# Patient Record
Sex: MALE | Race: WHITE | NOT HISPANIC OR LATINO | Employment: FULL TIME | URBAN - NONMETROPOLITAN AREA
[De-identification: names, ages, dates, MRNs, and addresses within clinical notes are randomized per-mention and may not be internally consistent; named-entity substitution may affect disease eponyms.]

---

## 2023-05-08 DIAGNOSIS — I10 BENIGN HYPERTENSION: ICD-10-CM

## 2023-05-08 RX ORDER — TRIAMTERENE AND HYDROCHLOROTHIAZIDE 75; 50 MG/1; MG/1
1 TABLET ORAL DAILY
Qty: 90 TABLET | Refills: 0 | Status: SHIPPED | OUTPATIENT
Start: 2023-05-08 | End: 2023-08-07 | Stop reason: SDUPTHER

## 2023-05-08 RX ORDER — TRIAMTERENE AND HYDROCHLOROTHIAZIDE 75; 50 MG/1; MG/1
1 TABLET ORAL DAILY
COMMUNITY
End: 2023-05-08 | Stop reason: SDUPTHER

## 2023-05-19 PROBLEM — I10 BENIGN ESSENTIAL HYPERTENSION: Status: ACTIVE | Noted: 2023-05-19

## 2023-05-19 PROBLEM — E78.5 HYPERLIPIDEMIA: Status: ACTIVE | Noted: 2023-05-19

## 2023-05-19 NOTE — PROGRESS NOTES
Subjective   Patient ID:   Freedom Lay is a 58 y.o. male who presents for No chief complaint on file..  HPI  HTN:  Taking Triamterene-HCTZ.  BP today is  BP has been up and down.  Was to start checking at home.  Denies dizziness, headaches.    HLD:  Taking Atorvastatin.  Last checked Dec 2022.  DUE for labs.    Health maintenance:  Smoking: Former smoker.  PSA (50+): June 2022  Labs: June 2022. DUE  Colonoscopy (50-75): Aug 2022  Influenza:     Review of Systems  12 point review of systems negative unless stated above in HPI    There were no vitals filed for this visit.    Physical Exam  General: Alert and oriented, well nourished, no acute distress.  Lungs: Clear to auscultation, non-labored respiration.  Heart: Normal rate, regular rhythm, no murmur, gallop or edema.  Neurologic: Awake, alert, and oriented X3, CN II-XII intact.  Psychiatric: Cooperative, appropriate mood and affect.    Assessment/Plan   Diagnoses and all orders for this visit:  Routine general medical examination at a health care facility  Screening PSA (prostate specific antigen)  Benign essential hypertension  Pure hypercholesterolemia

## 2023-05-23 ENCOUNTER — OFFICE VISIT (OUTPATIENT)
Dept: PRIMARY CARE | Facility: CLINIC | Age: 58
End: 2023-05-23
Payer: COMMERCIAL

## 2023-05-23 ENCOUNTER — LAB (OUTPATIENT)
Dept: LAB | Facility: LAB | Age: 58
End: 2023-05-23
Payer: COMMERCIAL

## 2023-05-23 VITALS
DIASTOLIC BLOOD PRESSURE: 92 MMHG | SYSTOLIC BLOOD PRESSURE: 136 MMHG | OXYGEN SATURATION: 97 % | BODY MASS INDEX: 29.98 KG/M2 | WEIGHT: 202.4 LBS | HEART RATE: 71 BPM | HEIGHT: 69 IN

## 2023-05-23 DIAGNOSIS — E78.00 PURE HYPERCHOLESTEROLEMIA: ICD-10-CM

## 2023-05-23 DIAGNOSIS — Z00.00 ROUTINE GENERAL MEDICAL EXAMINATION AT A HEALTH CARE FACILITY: Primary | ICD-10-CM

## 2023-05-23 DIAGNOSIS — R10.9 STOMACH CRAMPS: ICD-10-CM

## 2023-05-23 DIAGNOSIS — Z12.5 SCREENING PSA (PROSTATE SPECIFIC ANTIGEN): ICD-10-CM

## 2023-05-23 DIAGNOSIS — I10 BENIGN ESSENTIAL HYPERTENSION: ICD-10-CM

## 2023-05-23 PROBLEM — M51.34 DEGENERATION OF INTERVERTEBRAL DISC OF THORACIC REGION: Status: ACTIVE | Noted: 2023-05-23

## 2023-05-23 PROBLEM — R31.29 MICROSCOPIC HEMATURIA: Status: RESOLVED | Noted: 2023-05-23 | Resolved: 2023-05-23

## 2023-05-23 PROBLEM — J06.9 ACUTE URI: Status: RESOLVED | Noted: 2023-05-23 | Resolved: 2023-05-23

## 2023-05-23 PROBLEM — R10.11 ABDOMINAL PAIN, RUQ (RIGHT UPPER QUADRANT): Status: RESOLVED | Noted: 2023-05-23 | Resolved: 2023-05-23

## 2023-05-23 PROBLEM — R09.81 NASAL CONGESTION: Status: RESOLVED | Noted: 2023-05-23 | Resolved: 2023-05-23

## 2023-05-23 PROBLEM — J30.9 ALLERGIC RHINITIS: Status: RESOLVED | Noted: 2023-05-23 | Resolved: 2023-05-23

## 2023-05-23 PROBLEM — S23.9XXA THORACIC SPRAIN: Status: RESOLVED | Noted: 2023-05-23 | Resolved: 2023-05-23

## 2023-05-23 PROBLEM — R07.9 CHEST PAIN: Status: RESOLVED | Noted: 2023-05-23 | Resolved: 2023-05-23

## 2023-05-23 PROBLEM — E66.3 OVERWEIGHT (BMI 25.0-29.9): Status: RESOLVED | Noted: 2023-05-23 | Resolved: 2023-05-23

## 2023-05-23 PROBLEM — R91.8 LUNG NODULES: Status: ACTIVE | Noted: 2023-05-23

## 2023-05-23 LAB
ALANINE AMINOTRANSFERASE (SGPT) (U/L) IN SER/PLAS: 33 U/L (ref 10–52)
ALBUMIN (G/DL) IN SER/PLAS: 4.2 G/DL (ref 3.4–5)
ALKALINE PHOSPHATASE (U/L) IN SER/PLAS: 60 U/L (ref 33–120)
ANION GAP IN SER/PLAS: 10 MMOL/L (ref 10–20)
ASPARTATE AMINOTRANSFERASE (SGOT) (U/L) IN SER/PLAS: 27 U/L (ref 9–39)
BASOPHILS (10*3/UL) IN BLOOD BY AUTOMATED COUNT: 0.06 X10E9/L (ref 0–0.1)
BASOPHILS/100 LEUKOCYTES IN BLOOD BY AUTOMATED COUNT: 1.1 % (ref 0–2)
BILIRUBIN TOTAL (MG/DL) IN SER/PLAS: 0.6 MG/DL (ref 0–1.2)
CALCIUM (MG/DL) IN SER/PLAS: 9.6 MG/DL (ref 8.6–10.3)
CARBON DIOXIDE, TOTAL (MMOL/L) IN SER/PLAS: 30 MMOL/L (ref 21–32)
CHLORIDE (MMOL/L) IN SER/PLAS: 102 MMOL/L (ref 98–107)
CHOLESTEROL (MG/DL) IN SER/PLAS: 132 MG/DL (ref 0–199)
CHOLESTEROL IN HDL (MG/DL) IN SER/PLAS: 43.1 MG/DL
CHOLESTEROL/HDL RATIO: 3.1
CREATININE (MG/DL) IN SER/PLAS: 0.94 MG/DL (ref 0.5–1.3)
EOSINOPHILS (10*3/UL) IN BLOOD BY AUTOMATED COUNT: 0.14 X10E9/L (ref 0–0.7)
EOSINOPHILS/100 LEUKOCYTES IN BLOOD BY AUTOMATED COUNT: 2.6 % (ref 0–6)
ERYTHROCYTE DISTRIBUTION WIDTH (RATIO) BY AUTOMATED COUNT: 13.7 % (ref 11.5–14.5)
ERYTHROCYTE MEAN CORPUSCULAR HEMOGLOBIN CONCENTRATION (G/DL) BY AUTOMATED: 33.8 G/DL (ref 32–36)
ERYTHROCYTE MEAN CORPUSCULAR VOLUME (FL) BY AUTOMATED COUNT: 91 FL (ref 80–100)
ERYTHROCYTES (10*6/UL) IN BLOOD BY AUTOMATED COUNT: 5.25 X10E12/L (ref 4.5–5.9)
GFR MALE: >90 ML/MIN/1.73M2
GLUCOSE (MG/DL) IN SER/PLAS: 92 MG/DL (ref 74–99)
HEMATOCRIT (%) IN BLOOD BY AUTOMATED COUNT: 47.6 % (ref 41–52)
HEMOGLOBIN (G/DL) IN BLOOD: 16.1 G/DL (ref 13.5–17.5)
IMMATURE GRANULOCYTES/100 LEUKOCYTES IN BLOOD BY AUTOMATED COUNT: 0.6 % (ref 0–0.9)
LDL: 78 MG/DL (ref 0–99)
LEUKOCYTES (10*3/UL) IN BLOOD BY AUTOMATED COUNT: 5.3 X10E9/L (ref 4.4–11.3)
LYMPHOCYTES (10*3/UL) IN BLOOD BY AUTOMATED COUNT: 1.4 X10E9/L (ref 1.2–4.8)
LYMPHOCYTES/100 LEUKOCYTES IN BLOOD BY AUTOMATED COUNT: 26.5 % (ref 13–44)
MONOCYTES (10*3/UL) IN BLOOD BY AUTOMATED COUNT: 0.58 X10E9/L (ref 0.1–1)
MONOCYTES/100 LEUKOCYTES IN BLOOD BY AUTOMATED COUNT: 11 % (ref 2–10)
NEUTROPHILS (10*3/UL) IN BLOOD BY AUTOMATED COUNT: 3.08 X10E9/L (ref 1.2–7.7)
NEUTROPHILS/100 LEUKOCYTES IN BLOOD BY AUTOMATED COUNT: 58.2 % (ref 40–80)
PLATELETS (10*3/UL) IN BLOOD AUTOMATED COUNT: 278 X10E9/L (ref 150–450)
POTASSIUM (MMOL/L) IN SER/PLAS: 3.8 MMOL/L (ref 3.5–5.3)
PROSTATE SPECIFIC ANTIGEN,SCREEN: 1.2 NG/ML (ref 0–4)
PROTEIN TOTAL: 7 G/DL (ref 6.4–8.2)
SODIUM (MMOL/L) IN SER/PLAS: 138 MMOL/L (ref 136–145)
TRIGLYCERIDE (MG/DL) IN SER/PLAS: 57 MG/DL (ref 0–149)
UREA NITROGEN (MG/DL) IN SER/PLAS: 16 MG/DL (ref 6–23)
VLDL: 11 MG/DL (ref 0–40)

## 2023-05-23 PROCEDURE — 84153 ASSAY OF PSA TOTAL: CPT

## 2023-05-23 PROCEDURE — 1036F TOBACCO NON-USER: CPT | Performed by: PHYSICIAN ASSISTANT

## 2023-05-23 PROCEDURE — 3075F SYST BP GE 130 - 139MM HG: CPT | Performed by: PHYSICIAN ASSISTANT

## 2023-05-23 PROCEDURE — 3080F DIAST BP >= 90 MM HG: CPT | Performed by: PHYSICIAN ASSISTANT

## 2023-05-23 PROCEDURE — 36415 COLL VENOUS BLD VENIPUNCTURE: CPT

## 2023-05-23 PROCEDURE — 85025 COMPLETE CBC W/AUTO DIFF WBC: CPT

## 2023-05-23 PROCEDURE — 3077F SYST BP >= 140 MM HG: CPT | Performed by: PHYSICIAN ASSISTANT

## 2023-05-23 PROCEDURE — 80053 COMPREHEN METABOLIC PANEL: CPT

## 2023-05-23 PROCEDURE — 3079F DIAST BP 80-89 MM HG: CPT | Performed by: PHYSICIAN ASSISTANT

## 2023-05-23 PROCEDURE — 80061 LIPID PANEL: CPT

## 2023-05-23 PROCEDURE — 99396 PREV VISIT EST AGE 40-64: CPT | Performed by: PHYSICIAN ASSISTANT

## 2023-05-23 RX ORDER — ATORVASTATIN CALCIUM 20 MG/1
20 TABLET, FILM COATED ORAL DAILY
Qty: 90 TABLET | Refills: 1 | Status: SHIPPED | OUTPATIENT
Start: 2023-05-23 | End: 2023-11-06 | Stop reason: SDUPTHER

## 2023-05-23 RX ORDER — ATORVASTATIN CALCIUM 20 MG/1
20 TABLET, FILM COATED ORAL DAILY
COMMUNITY
End: 2023-05-23 | Stop reason: SDUPTHER

## 2023-05-23 NOTE — PROGRESS NOTES
Subjective   Patient ID:   Freedom Lay is a 58 y.o. male who presents for Annual Exam.  HPI  Pain scale: 0 (no pain)  Living will? No  POA? No  Are you currently or have you recently been threatened or abused? No  Do you feel unsafe going back to the place you are living? No  Reported health: Fair  Dental visits? No  Hearing problems? No  Vision problems? Yes - wears glasses  Healthy diet? Yes  Exercise? No exercise  Adequate fluid intake? No    HTN:  Taking Triamterene-HCTZ.  BP today is 155/89.  Improved to 136/92 on re-check.  BP has been up and down.  It was better at a work physical in Feb 2023.  He does not regularly check at home.  Denies dizziness, headaches.    Abdominal cramping:  Symptoms x years.  RUQ mostly.  Off and on.  Not overly bothersome.  States he has had a number of stomach scans that came back normal.  Possibly muscle?    HLD:  Taking Atorvastatin.  Last checked Dec 2022.  DUE for labs.    Health maintenance:  Smoking: Former smoker.  PSA (50+): June 2022. DUE  Labs: June 2022. DUE  Colonoscopy (50-75): Aug 2022  Influenza:     Social History     Tobacco Use    Smoking status: Former     Types: Cigarettes     Quit date: 2013     Years since quitting: 10.3    Smokeless tobacco: Never   Substance Use Topics    Alcohol use: Not Currently     Comment: socially    Drug use: Never       Immunization History   Administered Date(s) Administered    Pfizer Purple Cap SARS-CoV-2 07/22/2021, 08/16/2021       Review of Systems  12 point review of systems negative unless stated above in HPI    Vitals:    05/23/23 0835   BP: 155/89   Pulse: 71   SpO2: 97%       Physical Exam  General: Alert and oriented, well nourished, no acute distress.  Lungs: Clear to auscultation, non-labored respiration.  Heart: Normal rate, regular rhythm, no murmur, gallop or edema.  Neurologic: Awake, alert, and oriented X3, CN II-XII intact.  Psychiatric: Cooperative, appropriate mood and affect.    Assessment/Plan   This counts as  your annual Wellness visit.  Health maintenance was reviewed today.  Depression screening is negative.  I have ordered some labs to be done as soon as you can.  We will call you with the results.  Consider a trial of a muscle relaxer for the stomach symptoms.  BP improved on re-check.  Continue the same medications.  Chronic conditions are stable.  Call with questions or concerns.    F/u  6 months  Diagnoses and all orders for this visit:  Routine general medical examination at a health care facility  Screening PSA (prostate specific antigen)  -     Prostate Specific Antigen, Screen; Future  Benign essential hypertension  Pure hypercholesterolemia  -     Comprehensive Metabolic Panel; Future  -     Lipid Panel; Future  -     CBC and Auto Differential; Future

## 2023-08-07 DIAGNOSIS — I10 BENIGN HYPERTENSION: ICD-10-CM

## 2023-08-07 RX ORDER — TRIAMTERENE AND HYDROCHLOROTHIAZIDE 75; 50 MG/1; MG/1
1 TABLET ORAL DAILY
Qty: 90 TABLET | Refills: 0 | Status: SHIPPED | OUTPATIENT
Start: 2023-08-07 | End: 2023-11-06 | Stop reason: SDUPTHER

## 2023-11-06 DIAGNOSIS — I10 BENIGN HYPERTENSION: ICD-10-CM

## 2023-11-06 DIAGNOSIS — E78.00 PURE HYPERCHOLESTEROLEMIA: ICD-10-CM

## 2023-11-06 RX ORDER — ATORVASTATIN CALCIUM 20 MG/1
20 TABLET, FILM COATED ORAL DAILY
Qty: 90 TABLET | Refills: 0 | Status: SHIPPED | OUTPATIENT
Start: 2023-11-06 | End: 2023-11-28 | Stop reason: SDUPTHER

## 2023-11-06 RX ORDER — TRIAMTERENE AND HYDROCHLOROTHIAZIDE 75; 50 MG/1; MG/1
1 TABLET ORAL DAILY
Qty: 90 TABLET | Refills: 0 | Status: SHIPPED | OUTPATIENT
Start: 2023-11-06 | End: 2023-11-28 | Stop reason: SDUPTHER

## 2023-11-27 ENCOUNTER — APPOINTMENT (OUTPATIENT)
Dept: PRIMARY CARE | Facility: CLINIC | Age: 58
End: 2023-11-27
Payer: COMMERCIAL

## 2023-11-28 ENCOUNTER — APPOINTMENT (OUTPATIENT)
Dept: PRIMARY CARE | Facility: CLINIC | Age: 58
End: 2023-11-28
Payer: COMMERCIAL

## 2023-11-28 ENCOUNTER — OFFICE VISIT (OUTPATIENT)
Dept: PRIMARY CARE | Facility: CLINIC | Age: 58
End: 2023-11-28
Payer: COMMERCIAL

## 2023-11-28 VITALS
BODY MASS INDEX: 30.21 KG/M2 | HEIGHT: 69 IN | WEIGHT: 204 LBS | DIASTOLIC BLOOD PRESSURE: 81 MMHG | RESPIRATION RATE: 16 BRPM | SYSTOLIC BLOOD PRESSURE: 126 MMHG | HEART RATE: 80 BPM | OXYGEN SATURATION: 96 %

## 2023-11-28 DIAGNOSIS — I10 BENIGN HYPERTENSION: ICD-10-CM

## 2023-11-28 DIAGNOSIS — E78.00 PURE HYPERCHOLESTEROLEMIA: ICD-10-CM

## 2023-11-28 DIAGNOSIS — Z00.00 HEALTH CARE MAINTENANCE: ICD-10-CM

## 2023-11-28 PROCEDURE — 99214 OFFICE O/P EST MOD 30 MIN: CPT | Performed by: INTERNAL MEDICINE

## 2023-11-28 PROCEDURE — 1036F TOBACCO NON-USER: CPT | Performed by: INTERNAL MEDICINE

## 2023-11-28 PROCEDURE — 3079F DIAST BP 80-89 MM HG: CPT | Performed by: INTERNAL MEDICINE

## 2023-11-28 PROCEDURE — 3074F SYST BP LT 130 MM HG: CPT | Performed by: INTERNAL MEDICINE

## 2023-11-28 RX ORDER — TRIAMTERENE AND HYDROCHLOROTHIAZIDE 75; 50 MG/1; MG/1
1 TABLET ORAL DAILY
Qty: 90 TABLET | Refills: 1 | Status: SHIPPED | OUTPATIENT
Start: 2023-11-28 | End: 2024-05-21 | Stop reason: SDUPTHER

## 2023-11-28 RX ORDER — ATORVASTATIN CALCIUM 20 MG/1
20 TABLET, FILM COATED ORAL DAILY
Qty: 90 TABLET | Refills: 1 | Status: SHIPPED | OUTPATIENT
Start: 2023-11-28 | End: 2024-05-21 | Stop reason: SDUPTHER

## 2023-11-28 ASSESSMENT — PAIN SCALES - GENERAL: PAINLEVEL: 0-NO PAIN

## 2023-11-28 ASSESSMENT — PATIENT HEALTH QUESTIONNAIRE - PHQ9
SUM OF ALL RESPONSES TO PHQ9 QUESTIONS 1 AND 2: 0
2. FEELING DOWN, DEPRESSED OR HOPELESS: NOT AT ALL
1. LITTLE INTEREST OR PLEASURE IN DOING THINGS: NOT AT ALL

## 2023-11-28 NOTE — PROGRESS NOTES
Patient ID:   Freedom Lay is a 58 y.o. male with PMH remarkable for HTN, HLD, inguinal hernia repair with mesh, who presents to the office today for Follow-up, Hyperlipidemia, and Hypertension.    HEALTH MAINTENANCE: Follow Up  Smoking: Former Smoker, quit in 2013  Labs: 5/23/2023  PSA: 1.20 on 5/2023  Colonoscopy (45-75): 8/2/2022  CT Chest in 2016 noted benign stable nodules.   FU in 6m or sooner if needed.     SOCIAL HISTORY:  Social History     Tobacco Use    Smoking status: Former     Types: Cigarettes     Quit date: 2013     Years since quitting: 10.9    Smokeless tobacco: Never   Substance Use Topics    Alcohol use: Not Currently     Comment: socially    Drug use: Never     IMMUNIZATIONS:  Immunization History   Administered Date(s) Administered    Pfizer Purple Cap SARS-CoV-2 07/22/2021, 08/16/2021     REVIEW OF SYSTEMS:  Review of Systems   All other systems reviewed and are negative.    ALLERGIES:  No Known Allergies     VITAL SIGNS:  Vitals:    11/28/23 1211   BP: 126/81   Pulse: 80   Resp: 16   SpO2: 96%     Physical Exam  Vitals reviewed.   Constitutional:       General: He is not in acute distress.     Appearance: Normal appearance. He is not ill-appearing.   HENT:      Head: Normocephalic and atraumatic.      Right Ear: Tympanic membrane and external ear normal.      Left Ear: Tympanic membrane and external ear normal.      Nose: Nose normal.      Mouth/Throat:      Mouth: Mucous membranes are moist.      Pharynx: Oropharynx is clear.   Eyes:      Conjunctiva/sclera: Conjunctivae normal.      Pupils: Pupils are equal, round, and reactive to light.   Cardiovascular:      Rate and Rhythm: Normal rate and regular rhythm.      Heart sounds: Normal heart sounds. No murmur heard.  Pulmonary:      Effort: Pulmonary effort is normal. No respiratory distress.      Breath sounds: Normal breath sounds. No wheezing.   Abdominal:      General: There is no distension.      Palpations: Abdomen is soft. There is no  mass.      Tenderness: There is no abdominal tenderness.   Musculoskeletal:         General: Normal range of motion.      Cervical back: Normal range of motion and neck supple.   Skin:     General: Skin is warm and dry.   Neurological:      General: No focal deficit present.      Mental Status: He is alert and oriented to person, place, and time.      Sensory: No sensory deficit.      Motor: No weakness.      Coordination: Coordination normal.      Gait: Gait normal.   Psychiatric:         Mood and Affect: Mood normal.         Behavior: Behavior normal.       MEDICATIONS:  Current Outpatient Medications on File Prior to Visit   Medication Sig Dispense Refill    atorvastatin (Lipitor) 20 mg tablet Take 1 tablet (20 mg) by mouth once daily. 90 tablet 0    triamterene-hydrochlorothiazid (Maxzide) 75-50 mg tablet Take 1 tablet by mouth once daily. 90 tablet 0     No current facility-administered medications on file prior to visit.      LABORATORY DATA:  Lab Results   Component Value Date    WBC 5.3 05/23/2023    HGB 16.1 05/23/2023    HCT 47.6 05/23/2023     05/23/2023    CHOL 132 05/23/2023    TRIG 57 05/23/2023    HDL 43.1 05/23/2023    ALT 33 05/23/2023    AST 27 05/23/2023     05/23/2023    K 3.8 05/23/2023     05/23/2023    CREATININE 0.94 05/23/2023    BUN 16 05/23/2023    CO2 30 05/23/2023     ASSESSMENT AND PLAN:  Assessment/Plan   Diagnoses and all orders for this visit:  Health care maintenance  Pure hypercholesterolemia  -     atorvastatin (Lipitor) 20 mg tablet; Take 1 tablet (20 mg) by mouth once daily.  Benign hypertension  -     triamterene-hydrochlorothiazid (Maxzide) 75-50 mg tablet; Take 1 tablet by mouth once daily.      --------------------  Written by Radha Mcginnis RN, acting as a scribe for Dr. Peters. This note accurately reflects the work and decisions made by Dr. Peters.     I, Dr. Peters, attest all medical record entries made by the scribe were under my direction and were  personally dictated by me. I have reviewed the chart and agree that the record accurately reflects my performance of the history, physical exam, and assessment and plan.

## 2023-11-29 PROBLEM — Z00.00 HEALTH CARE MAINTENANCE: Status: ACTIVE | Noted: 2023-11-29

## 2024-03-25 NOTE — RESULT ENCOUNTER NOTE
If your double vision returns please come back to the emergency department for repeat evaluation   Normal lipid, CMP. Waiting on PSA

## 2024-05-21 ENCOUNTER — OFFICE VISIT (OUTPATIENT)
Dept: PRIMARY CARE | Facility: CLINIC | Age: 59
End: 2024-05-21
Payer: COMMERCIAL

## 2024-05-21 VITALS
HEART RATE: 76 BPM | HEIGHT: 69 IN | WEIGHT: 193.6 LBS | DIASTOLIC BLOOD PRESSURE: 90 MMHG | RESPIRATION RATE: 18 BRPM | BODY MASS INDEX: 28.68 KG/M2 | OXYGEN SATURATION: 96 % | SYSTOLIC BLOOD PRESSURE: 142 MMHG

## 2024-05-21 DIAGNOSIS — I10 BENIGN HYPERTENSION: ICD-10-CM

## 2024-05-21 DIAGNOSIS — Z00.00 HEALTH CARE MAINTENANCE: ICD-10-CM

## 2024-05-21 DIAGNOSIS — R91.8 LUNG NODULES: ICD-10-CM

## 2024-05-21 DIAGNOSIS — E78.00 PURE HYPERCHOLESTEROLEMIA: ICD-10-CM

## 2024-05-21 DIAGNOSIS — Z12.5 SCREENING PSA (PROSTATE SPECIFIC ANTIGEN): ICD-10-CM

## 2024-05-21 DIAGNOSIS — I10 BENIGN ESSENTIAL HYPERTENSION: ICD-10-CM

## 2024-05-21 PROCEDURE — 3077F SYST BP >= 140 MM HG: CPT | Performed by: INTERNAL MEDICINE

## 2024-05-21 PROCEDURE — 1036F TOBACCO NON-USER: CPT | Performed by: INTERNAL MEDICINE

## 2024-05-21 PROCEDURE — 99214 OFFICE O/P EST MOD 30 MIN: CPT | Performed by: INTERNAL MEDICINE

## 2024-05-21 PROCEDURE — 3080F DIAST BP >= 90 MM HG: CPT | Performed by: INTERNAL MEDICINE

## 2024-05-21 RX ORDER — TRIAMTERENE AND HYDROCHLOROTHIAZIDE 75; 50 MG/1; MG/1
1 TABLET ORAL DAILY
Qty: 90 TABLET | Refills: 1 | Status: SHIPPED | OUTPATIENT
Start: 2024-05-21

## 2024-05-21 RX ORDER — ATORVASTATIN CALCIUM 20 MG/1
20 TABLET, FILM COATED ORAL DAILY
Qty: 90 TABLET | Refills: 1 | Status: SHIPPED | OUTPATIENT
Start: 2024-05-21 | End: 2024-11-17

## 2024-05-21 ASSESSMENT — PATIENT HEALTH QUESTIONNAIRE - PHQ9
2. FEELING DOWN, DEPRESSED OR HOPELESS: NOT AT ALL
SUM OF ALL RESPONSES TO PHQ9 QUESTIONS 1 AND 2: 0
1. LITTLE INTEREST OR PLEASURE IN DOING THINGS: NOT AT ALL

## 2024-05-21 ASSESSMENT — PAIN SCALES - GENERAL: PAINLEVEL: 0-NO PAIN

## 2024-05-21 NOTE — PROGRESS NOTES
"Patient ID:   Freedom Lay is a 59 y.o. male with PMH remarkable for HTN, HLD, inguinal hernia repair with mesh, who presents to the office today for Follow-up (6 mth).    HEALTH MAINTENANCE: Follow Up  Last Office Visit: 2023 for FU  Smoking: Former Smoker, quit in   Labs: 2023 --> DUE  PSA: 1.20 on 2023 --> DUE  Colonoscopy (45-75): 2022 with Dr Luis Flanagan with polypectomy with pathology of tubular adenoma.  CT Chest in  noted benign stable nodules. 2019 CT calcium scoring was 0 but noted RML 4mm nodule. Will check in on this.  - wakes up ~1-2x at night to urinate  - walks a lot at work    FU in 6m or sooner if needed.     Social History     Tobacco Use    Smoking status: Former     Current packs/day: 0.00     Types: Cigarettes     Quit date:      Years since quittin.3     Passive exposure: Past    Smokeless tobacco: Never   Substance Use Topics    Alcohol use: Not Currently     Comment: socially    Drug use: Never     Review of Systems   All other systems reviewed and are negative.      Visit Vitals  /90   Pulse 76   Resp 18   Ht 1.753 m (5' 9\")   Wt 87.8 kg (193 lb 9.6 oz)   SpO2 96%   BMI 28.59 kg/m²   Smoking Status Former   BSA 2.07 m²   Weight 204# at last visit    Physical Exam  Vitals reviewed.   Constitutional:       General: He is awake. He is not in acute distress.     Appearance: Normal appearance. He is not ill-appearing.   HENT:      Head: Normocephalic and atraumatic.      Right Ear: External ear normal.      Left Ear: External ear normal.      Nose: Nose normal.      Mouth/Throat:      Mouth: Mucous membranes are moist.      Pharynx: Oropharynx is clear.   Eyes:      Conjunctiva/sclera: Conjunctivae normal.      Pupils: Pupils are equal, round, and reactive to light.   Cardiovascular:      Rate and Rhythm: Normal rate and regular rhythm.      Heart sounds: Normal heart sounds. No murmur heard.  Pulmonary:      Effort: Pulmonary effort is normal. No respiratory " distress.      Breath sounds: Normal breath sounds. No wheezing.   Abdominal:      General: There is no distension.      Palpations: Abdomen is soft. There is no mass.      Tenderness: There is no abdominal tenderness.   Musculoskeletal:         General: Normal range of motion.      Cervical back: Normal range of motion and neck supple.   Skin:     General: Skin is warm and dry.   Neurological:      General: No focal deficit present.      Mental Status: He is alert and oriented to person, place, and time.      Sensory: No sensory deficit.      Motor: No weakness.      Coordination: Coordination normal.      Gait: Gait normal.   Psychiatric:         Mood and Affect: Mood normal.         Behavior: Behavior normal. Behavior is cooperative.         Current Outpatient Medications   Medication Instructions    atorvastatin (LIPITOR) 20 mg, oral, Daily    triamterene-hydrochlorothiazid (Maxzide) 75-50 mg tablet 1 tablet, oral, Daily        Lab Results   Component Value Date    WBC 5.3 05/23/2023    HGB 16.1 05/23/2023    HCT 47.6 05/23/2023     05/23/2023    CHOL 132 05/23/2023    TRIG 57 05/23/2023    HDL 43.1 05/23/2023    ALT 33 05/23/2023    AST 27 05/23/2023     05/23/2023    K 3.8 05/23/2023     05/23/2023    CREATININE 0.94 05/23/2023    BUN 16 05/23/2023    CO2 30 05/23/2023     Assessment/Plan   Problem List Items Addressed This Visit             ICD-10-CM    Benign essential hypertension I10     - c/w maxzide 75-50mg QD         Relevant Medications    triamterene-hydrochlorothiazid (Maxzide) 75-50 mg tablet    Other Relevant Orders    CBC and Auto Differential    Comprehensive Metabolic Panel    Lipid Panel    TSH with reflex to Free T4 if abnormal    Vitamin D 25-Hydroxy,Total (for eval of Vitamin D levels)    Vitamin B12    Prostate Specific Antigen, Screen    Hyperlipidemia E78.5     - c/w statin  - check lipid panel         Relevant Medications    atorvastatin (Lipitor) 20 mg tablet    Other  Relevant Orders    Lipid Panel    Lung nodules R91.8     - will check CT chest         Relevant Orders    CT chest wo IV contrast    Health care maintenance Z00.00    Relevant Orders    CBC and Auto Differential    Comprehensive Metabolic Panel    Lipid Panel    TSH with reflex to Free T4 if abnormal    Vitamin D 25-Hydroxy,Total (for eval of Vitamin D levels)    Vitamin B12    Prostate Specific Antigen, Screen     Other Visit Diagnoses         Codes    Screening PSA (prostate specific antigen)     Z12.5    Relevant Orders    Prostate Specific Antigen, Screen          --------------------  Written by Radha Mcginnis RN, acting as a scribe for Dr. Peters. This note accurately reflects the work and decisions made by Dr. Peters.     I, Dr. Peters, attest all medical record entries made by the scribe were under my direction and were personally dictated by me. I have reviewed the chart and agree that the record accurately reflects my performance of the history, physical exam, and assessment and plan.

## 2024-05-21 NOTE — PATIENT INSTRUCTIONS
It was nice to see you in the office today!  As discussed during our visit...     Dr Peters ordered a CT lung screening for you to obtain.  For people who are at high risk for lung cancer (due to tobacco use), this screening will look for any lung nodules or growths. Our lung cancer specialists can identify abnormalities or other pulmonary issues sooner for improved outcomes.    Website for more information regarding this:  [id number]   -----------------------------------------------------  Please have your blood drawn in the next 1-2 weeks.   You need to be FASTING for 12 hours prior to blood draw.  You may have BLACK coffee, BLACK tea and/or water at any time during the fasting time.  We will contact you with the results of your blood work and any necessary adjustments to your plan of care.  If you do not hear from us within 3-5 days of having your blood drawn, please call the [place] office at [phone].     The two closest Outpatient Lab's to this office is:  [facility]  [address] [place] [address]    Hours:   Monday through Friday 7:30am - 4:30pm (Closed 12:30-1pm for lunch)     Or you can go to ...  [facility]  [address]  [place] [address] [phone]    Hours:   Monday through Friday 7:30am - 4:30pm (Closed 12:30-1pm for lunch)   Saturday 8am - 12pm    Website to confirm hours and location OR look up more locations ... [email or web address]

## 2024-05-28 ENCOUNTER — APPOINTMENT (OUTPATIENT)
Dept: PRIMARY CARE | Facility: CLINIC | Age: 59
End: 2024-05-28
Payer: COMMERCIAL

## 2024-05-29 ENCOUNTER — LAB (OUTPATIENT)
Dept: LAB | Facility: LAB | Age: 59
End: 2024-05-29
Payer: COMMERCIAL

## 2024-05-29 DIAGNOSIS — E78.00 PURE HYPERCHOLESTEROLEMIA: ICD-10-CM

## 2024-05-29 DIAGNOSIS — I10 BENIGN ESSENTIAL HYPERTENSION: ICD-10-CM

## 2024-05-29 DIAGNOSIS — Z12.5 SCREENING PSA (PROSTATE SPECIFIC ANTIGEN): ICD-10-CM

## 2024-05-29 DIAGNOSIS — Z00.00 HEALTH CARE MAINTENANCE: ICD-10-CM

## 2024-05-29 LAB
25(OH)D3 SERPL-MCNC: 34 NG/ML (ref 30–100)
ALBUMIN SERPL BCP-MCNC: 4.6 G/DL (ref 3.4–5)
ALP SERPL-CCNC: 61 U/L (ref 33–120)
ALT SERPL W P-5'-P-CCNC: 37 U/L (ref 10–52)
ANION GAP SERPL CALC-SCNC: 13 MMOL/L (ref 10–20)
AST SERPL W P-5'-P-CCNC: 29 U/L (ref 9–39)
BASOPHILS # BLD AUTO: 0.04 X10*3/UL (ref 0–0.1)
BASOPHILS NFR BLD AUTO: 0.9 %
BILIRUB SERPL-MCNC: 1 MG/DL (ref 0–1.2)
BUN SERPL-MCNC: 17 MG/DL (ref 6–23)
CALCIUM SERPL-MCNC: 9.6 MG/DL (ref 8.6–10.3)
CHLORIDE SERPL-SCNC: 101 MMOL/L (ref 98–107)
CHOLEST SERPL-MCNC: 140 MG/DL (ref 0–199)
CHOLESTEROL/HDL RATIO: 2.9
CO2 SERPL-SCNC: 28 MMOL/L (ref 21–32)
CREAT SERPL-MCNC: 0.93 MG/DL (ref 0.5–1.3)
EGFRCR SERPLBLD CKD-EPI 2021: >90 ML/MIN/1.73M*2
EOSINOPHIL # BLD AUTO: 0.11 X10*3/UL (ref 0–0.7)
EOSINOPHIL NFR BLD AUTO: 2.5 %
ERYTHROCYTE [DISTWIDTH] IN BLOOD BY AUTOMATED COUNT: 13.9 % (ref 11.5–14.5)
GLUCOSE SERPL-MCNC: 97 MG/DL (ref 74–99)
HCT VFR BLD AUTO: 45.2 % (ref 41–52)
HDLC SERPL-MCNC: 48.3 MG/DL
HGB BLD-MCNC: 15.8 G/DL (ref 13.5–17.5)
IMM GRANULOCYTES # BLD AUTO: 0.02 X10*3/UL (ref 0–0.7)
IMM GRANULOCYTES NFR BLD AUTO: 0.5 % (ref 0–0.9)
LDLC SERPL CALC-MCNC: 76 MG/DL
LYMPHOCYTES # BLD AUTO: 1.35 X10*3/UL (ref 1.2–4.8)
LYMPHOCYTES NFR BLD AUTO: 30.9 %
MCH RBC QN AUTO: 30.4 PG (ref 26–34)
MCHC RBC AUTO-ENTMCNC: 35 G/DL (ref 32–36)
MCV RBC AUTO: 87 FL (ref 80–100)
MONOCYTES # BLD AUTO: 0.49 X10*3/UL (ref 0.1–1)
MONOCYTES NFR BLD AUTO: 11.2 %
NEUTROPHILS # BLD AUTO: 2.36 X10*3/UL (ref 1.2–7.7)
NEUTROPHILS NFR BLD AUTO: 54 %
NON HDL CHOLESTEROL: 92 MG/DL (ref 0–149)
NRBC BLD-RTO: 0 /100 WBCS (ref 0–0)
PLATELET # BLD AUTO: 271 X10*3/UL (ref 150–450)
POTASSIUM SERPL-SCNC: 3.7 MMOL/L (ref 3.5–5.3)
PROT SERPL-MCNC: 6.9 G/DL (ref 6.4–8.2)
PSA SERPL-MCNC: 1.52 NG/ML
RBC # BLD AUTO: 5.2 X10*6/UL (ref 4.5–5.9)
SODIUM SERPL-SCNC: 138 MMOL/L (ref 136–145)
TRIGL SERPL-MCNC: 81 MG/DL (ref 0–149)
TSH SERPL-ACNC: 1.8 MIU/L (ref 0.44–3.98)
VIT B12 SERPL-MCNC: 433 PG/ML (ref 211–911)
VLDL: 16 MG/DL (ref 0–40)
WBC # BLD AUTO: 4.4 X10*3/UL (ref 4.4–11.3)

## 2024-05-29 PROCEDURE — 84153 ASSAY OF PSA TOTAL: CPT

## 2024-05-29 PROCEDURE — 36415 COLL VENOUS BLD VENIPUNCTURE: CPT

## 2024-05-29 PROCEDURE — 82306 VITAMIN D 25 HYDROXY: CPT

## 2024-05-29 PROCEDURE — 82607 VITAMIN B-12: CPT

## 2024-06-04 ENCOUNTER — HOSPITAL ENCOUNTER (OUTPATIENT)
Dept: RADIOLOGY | Facility: CLINIC | Age: 59
Discharge: HOME | End: 2024-06-04
Payer: COMMERCIAL

## 2024-06-04 DIAGNOSIS — R91.8 LUNG NODULES: ICD-10-CM

## 2024-06-04 PROCEDURE — 71250 CT THORAX DX C-: CPT | Performed by: RADIOLOGY

## 2024-06-04 PROCEDURE — 71250 CT THORAX DX C-: CPT

## 2024-11-15 DIAGNOSIS — I10 BENIGN HYPERTENSION: ICD-10-CM

## 2024-11-15 RX ORDER — TRIAMTERENE AND HYDROCHLOROTHIAZIDE 75; 50 MG/1; MG/1
1 TABLET ORAL DAILY
Qty: 90 TABLET | Refills: 0 | Status: SHIPPED | OUTPATIENT
Start: 2024-11-15

## 2024-11-19 DIAGNOSIS — E78.00 PURE HYPERCHOLESTEROLEMIA: ICD-10-CM

## 2024-11-19 RX ORDER — ATORVASTATIN CALCIUM 20 MG/1
20 TABLET, FILM COATED ORAL DAILY
Qty: 90 TABLET | Refills: 0 | Status: SHIPPED | OUTPATIENT
Start: 2024-11-19 | End: 2025-02-17

## 2025-02-17 DIAGNOSIS — I10 BENIGN HYPERTENSION: ICD-10-CM

## 2025-02-17 RX ORDER — TRIAMTERENE AND HYDROCHLOROTHIAZIDE 75; 50 MG/1; MG/1
1 TABLET ORAL DAILY
Qty: 90 TABLET | Refills: 0 | Status: SHIPPED | OUTPATIENT
Start: 2025-02-17

## 2025-04-04 DIAGNOSIS — E78.00 PURE HYPERCHOLESTEROLEMIA: ICD-10-CM

## 2025-04-04 RX ORDER — ATORVASTATIN CALCIUM 20 MG/1
20 TABLET, FILM COATED ORAL DAILY
Qty: 30 TABLET | Refills: 0 | Status: SHIPPED | OUTPATIENT
Start: 2025-04-04

## 2025-05-14 DIAGNOSIS — I10 BENIGN HYPERTENSION: ICD-10-CM

## 2025-05-14 RX ORDER — TRIAMTERENE AND HYDROCHLOROTHIAZIDE 75; 50 MG/1; MG/1
1 TABLET ORAL DAILY
Qty: 90 TABLET | Refills: 0 | Status: SHIPPED | OUTPATIENT
Start: 2025-05-14

## 2025-06-11 ENCOUNTER — APPOINTMENT (OUTPATIENT)
Dept: PRIMARY CARE | Facility: CLINIC | Age: 60
End: 2025-06-11
Payer: COMMERCIAL

## 2025-06-11 VITALS
WEIGHT: 195 LBS | HEART RATE: 88 BPM | DIASTOLIC BLOOD PRESSURE: 80 MMHG | SYSTOLIC BLOOD PRESSURE: 137 MMHG | OXYGEN SATURATION: 95 % | RESPIRATION RATE: 18 BRPM | BODY MASS INDEX: 29.55 KG/M2 | HEIGHT: 68 IN

## 2025-06-11 DIAGNOSIS — I10 BENIGN HYPERTENSION: ICD-10-CM

## 2025-06-11 DIAGNOSIS — R91.8 LUNG NODULES: ICD-10-CM

## 2025-06-11 DIAGNOSIS — E78.5 HYPERLIPIDEMIA, UNSPECIFIED HYPERLIPIDEMIA TYPE: ICD-10-CM

## 2025-06-11 DIAGNOSIS — I10 BENIGN ESSENTIAL HYPERTENSION: ICD-10-CM

## 2025-06-11 DIAGNOSIS — Z13.29 THYROID DISORDER SCREENING: ICD-10-CM

## 2025-06-11 DIAGNOSIS — M25.512 LEFT SHOULDER PAIN, UNSPECIFIED CHRONICITY: ICD-10-CM

## 2025-06-11 DIAGNOSIS — E55.9 VITAMIN D DEFICIENCY: ICD-10-CM

## 2025-06-11 DIAGNOSIS — Z00.00 HEALTH CARE MAINTENANCE: ICD-10-CM

## 2025-06-11 DIAGNOSIS — E78.00 PURE HYPERCHOLESTEROLEMIA: ICD-10-CM

## 2025-06-11 DIAGNOSIS — Z13.21 ENCOUNTER FOR VITAMIN DEFICIENCY SCREENING: ICD-10-CM

## 2025-06-11 DIAGNOSIS — Z12.5 PROSTATE CANCER SCREENING: ICD-10-CM

## 2025-06-11 PROCEDURE — 3008F BODY MASS INDEX DOCD: CPT | Performed by: INTERNAL MEDICINE

## 2025-06-11 PROCEDURE — 1036F TOBACCO NON-USER: CPT | Performed by: INTERNAL MEDICINE

## 2025-06-11 PROCEDURE — 3079F DIAST BP 80-89 MM HG: CPT | Performed by: INTERNAL MEDICINE

## 2025-06-11 PROCEDURE — 99214 OFFICE O/P EST MOD 30 MIN: CPT | Performed by: INTERNAL MEDICINE

## 2025-06-11 PROCEDURE — G0438 PPPS, INITIAL VISIT: HCPCS | Performed by: INTERNAL MEDICINE

## 2025-06-11 PROCEDURE — 3075F SYST BP GE 130 - 139MM HG: CPT | Performed by: INTERNAL MEDICINE

## 2025-06-11 RX ORDER — ATORVASTATIN CALCIUM 20 MG/1
20 TABLET, FILM COATED ORAL DAILY
Qty: 90 TABLET | Refills: 1 | Status: SHIPPED | OUTPATIENT
Start: 2025-06-11

## 2025-06-11 RX ORDER — TRIAMTERENE AND HYDROCHLOROTHIAZIDE 75; 50 MG/1; MG/1
1 TABLET ORAL DAILY
Qty: 90 TABLET | Refills: 0 | Status: SHIPPED | OUTPATIENT
Start: 2025-06-11

## 2025-06-11 ASSESSMENT — PATIENT HEALTH QUESTIONNAIRE - PHQ9
SUM OF ALL RESPONSES TO PHQ9 QUESTIONS 1 AND 2: 0
1. LITTLE INTEREST OR PLEASURE IN DOING THINGS: NOT AT ALL
2. FEELING DOWN, DEPRESSED OR HOPELESS: NOT AT ALL

## 2025-06-11 ASSESSMENT — ENCOUNTER SYMPTOMS
GASTROINTESTINAL NEGATIVE: 1
RESPIRATORY NEGATIVE: 1
ARTHRALGIAS: 1
CONSTITUTIONAL NEGATIVE: 1
CARDIOVASCULAR NEGATIVE: 1
PSYCHIATRIC NEGATIVE: 1
NEUROLOGICAL NEGATIVE: 1

## 2025-06-11 ASSESSMENT — PAIN SCALES - GENERAL: PAINLEVEL_OUTOF10: 2

## 2025-06-11 NOTE — PATIENT INSTRUCTIONS
It was great to see you in the office today! Here is what we discussed at your visit today:    Please continue to take your current medications     Please have your blood drawn in the next 1-2 weeks.   You need to be FASTING for 12 hours prior to blood draw.  Please contact your insurance company to ask about the best location to get blood drawn.  We will contact you with the results of your blood work and any necessary adjustments to your plan of care.  Iif you do not hear from us within 3-5 days of having your blood drawn, please call the [place] office at [phone].    Follow up in six months

## 2025-06-11 NOTE — PROGRESS NOTES
Subjective   Patient ID:   Freedom Lay is a 60 y.o. male who presents for Annual Exam.  HPI  Pain scale:   Living will? No  POA? No  Are you currently or have you recently been threatened or abused? No  Do you feel unsafe going back to the place you are living? No  Reported health: Fair  Dental visits? No  Hearing problems? No  Vision problems? Yes - wears glasses  Healthy diet? Yes  Exercise? Exercise 6-7x per week  Adequate fluid intake? No    Social History[1]    Immunization History   Administered Date(s) Administered    COVID-19, mRNA, LNP-S, PF, 30 mcg/0.3 mL dose 2021, 2021       Review of Systems  12 point review of systems negative unless stated above in HPI    There were no vitals filed for this visit.    Physical Exam  General: Alert and oriented, well nourished, no acute distress.  Lungs: Clear to auscultation, non-labored respiration.  Heart: Normal rate, regular rhythm, no murmur, gallop or edema.  Neurologic: Awake, alert, and oriented X3, CN II-XII intact.  Psychiatric: Cooperative, appropriate mood and affect.    Assessment/Plan          [1]   Social History  Tobacco Use    Smoking status: Former     Current packs/day: 0.00     Types: Cigarettes     Quit date:      Years since quittin.4     Passive exposure: Past    Smokeless tobacco: Never   Substance Use Topics    Alcohol use: Not Currently     Comment: socially    Drug use: Never

## 2025-06-11 NOTE — PROGRESS NOTES
Patient ID: Freedom Lay is a 60 y.o. male with PMH remarkable for HTN, HLD, inguinal hernia repair with mesh, who presents to the office today for Annual Exam.    HEALTH MAINTENANCE: Annual Wellness Physical  Last Office Visit: 5/21/24 labs ordered, CT chest ordered to check on RML 4mm nodule noted on 20199 CT calcium score test(score was 0)  Last Labs: 5/29/24 wnl, DUE  PSA Screening (starting at age 55 till 69): 5/29/24 wnl  Colonoscopy (45-75 or age 40 with 1st degree relative dx colon ca): 8/2/2022 with Dr Luis Flanagan with polypectomy with pathology of tubular adenoma.   Lung cancer screening (50-78 y/o x 20 pk yr for at least 20 yrs + current smoker OR quit in last 15 years, no CT w/I last year): CT chest without contrast from 06/10/24 revealed: 1.  No acute process.  2. A few tiny pulmonary nodules which are stable since 2016 which are considered benign. No further follow-up scan is recommended  DEXA (65+, q 2 years women and 70+ men): na    HPI  He states he has been having right shoulder pain for past two months, but it is slowly getting better. He states he does not remember doing anything to it. Declines PT. Declines steroids. Denies depression. Sleeps ok. Denies any issues with urination. He states he has not had his cholesterol medication for about a month, ran out of medication and then thought it got filled, but it was only his BP medication that had been filled and then he decided to wait until coming in today to get filled.     Medical History[1]   Surgical History[2]   Social History[3]  Family History[4]   Immunization History   Administered Date(s) Administered    COVID-19, mRNA, LNP-S, PF, 30 mcg/0.3 mL dose 07/22/2021, 08/16/2021     Review of Systems   Constitutional: Negative.    HENT: Negative.     Respiratory: Negative.     Cardiovascular: Negative.    Gastrointestinal: Negative.    Genitourinary: Negative.    Musculoskeletal:  Positive for arthralgias.   Skin: Negative.    Neurological:  "Negative.    Psychiatric/Behavioral: Negative.       Allergies[5]  Visit Vitals  /80 (BP Location: Left arm, Patient Position: Sitting)   Pulse 88   Resp 18   Ht 1.727 m (5' 8\")   Wt 88.5 kg (195 lb)   SpO2 95%   BMI 29.65 kg/m²   Smoking Status Former   BSA 2.06 m²     Physical Exam  Vitals reviewed.   Constitutional:       Appearance: Normal appearance.   HENT:      Head: Normocephalic and atraumatic.      Nose: Nose normal.      Mouth/Throat:      Pharynx: Oropharynx is clear.   Cardiovascular:      Rate and Rhythm: Normal rate and regular rhythm.      Pulses: Normal pulses.      Heart sounds: Normal heart sounds.   Pulmonary:      Effort: Pulmonary effort is normal.      Breath sounds: Normal breath sounds.   Abdominal:      General: Bowel sounds are normal.      Palpations: Abdomen is soft.   Musculoskeletal:         General: Tenderness present. Normal range of motion.      Cervical back: Normal range of motion.   Skin:     General: Skin is warm and dry.   Neurological:      General: No focal deficit present.      Mental Status: He is alert and oriented to person, place, and time.   Psychiatric:         Attention and Perception: Attention normal.         Mood and Affect: Mood normal.         Speech: Speech normal.         Behavior: Behavior normal. Behavior is cooperative.         Thought Content: Thought content normal.         Cognition and Memory: Cognition normal.       Current Outpatient Medications   Medication Instructions    atorvastatin (LIPITOR) 20 mg, oral, Daily, PLEASE MAKE AN APPT FOR FURTHER REFILLS    triamterene-hydrochlorothiazid (Maxzide) 75-50 mg tablet 1 tablet, oral, Daily     Lab Results   Component Value Date    WBC 4.4 05/29/2024    HGB 15.8 05/29/2024    HCT 45.2 05/29/2024     05/29/2024    CHOL 140 05/29/2024    TRIG 81 05/29/2024    HDL 48.3 05/29/2024    ALT 37 05/29/2024    AST 29 05/29/2024     05/29/2024    K 3.7 05/29/2024     05/29/2024    CREATININE " 0.93 05/29/2024    BUN 17 05/29/2024    CO2 28 05/29/2024    TSH 1.80 05/29/2024       Problem List Items Addressed This Visit           ICD-10-CM    Benign essential hypertension I10    BP is good in office today, 137/80  Continue with triamterene-hydrochlorothiazide          Relevant Medications    triamterene-hydrochlorothiazid (Maxzide) 75-50 mg tablet    Other Relevant Orders    CBC and Auto Differential    Comprehensive metabolic panel    Hyperlipidemia E78.5    He states he ran out of statin about a month ago, knew he was coming in today  Refill sent in  - Patient educated and counseled to do walking and exercise regularly  - Low-fat low-cholesterol diet is advised  - Advised to reduce weight   - The goal is to keep the LDL less than 70, and HDL the more than 40            Relevant Medications    atorvastatin (Lipitor) 20 mg tablet    Other Relevant Orders    Lipid panel    Comprehensive metabolic panel    Lung nodules R91.8    CT chest without contrast from 06/10/24 revealed: 1.  No acute process.  2. A few tiny pulmonary nodules which are stable since 2016 which are considered benign. No further follow-up scan is recommended         Health care maintenance Z00.00    Left shoulder pain M25.512    He states his left shoulder started hurting about two months ago, denies any falls or trauma  States pain is improving  Declines oral steroids or PT referral          Other Visit Diagnoses         Codes      Prostate cancer screening     Z12.5    Relevant Orders    PSA      Thyroid disorder screening     Z13.29    Relevant Orders    Tsh With Reflex To Free T4 If Abnormal      Vitamin D deficiency     E55.9    Relevant Orders    Vitamin D 25-Hydroxy,Total (for eval of Vitamin D levels)      Encounter for vitamin deficiency screening     Z13.21    Relevant Orders    Vitamin B12      Benign hypertension     I10    Relevant Medications    triamterene-hydrochlorothiazid (Maxzide) 75-50 mg tablet           --------------------  Written by Gregoria Okeefe RN, acting as a scribe for Dr. Peters. This note accurately reflects the work and decisions made by Dr. Peters.     I, Dr. Peters, attest all medical record entries made by the scribe were under my direction and were personally dictated by me. I have reviewed the chart and agree that the record accurately reflects my performance of the history, physical exam, and assessment and plan.         [1]   Past Medical History:  Diagnosis Date    Abdominal pain, RUQ (right upper quadrant) 2023    Allergic rhinitis 2023    Body mass index (BMI) 27.0-27.9, adult 10/02/2019    Body mass index (BMI) of 27.0 to 27.9 in adult    Chest pain 2023    Flank pain 2023    Hypertension     Microscopic hematuria 2023    Nasal congestion 2023    Overweight 2021    Overweight with body mass index (BMI) 25.0-29.9    Smoker 2010    Thoracic sprain 2023   [2]   Past Surgical History:  Procedure Laterality Date    HERNIA REPAIR  2014    Hernia Repair Inguinal Sliding    OTHER SURGICAL HISTORY  2015    Cystoscopy With Biopsy    VASECTOMY  2014    Surgery Vas Deferens Vasectomy   [3]   Social History  Tobacco Use    Smoking status: Former     Current packs/day: 0.00     Average packs/day: 1 pack/day for 27.0 years (27.0 ttl pk-yrs)     Types: Cigarettes     Start date:      Quit date: 2013     Years since quittin.4     Passive exposure: Past    Smokeless tobacco: Never   Substance Use Topics    Alcohol use: Not Currently     Comment: socially    Drug use: Never   [4]   Family History  Problem Relation Name Age of Onset    Hypertension Mother      Lung cancer Father      Hypertension Father     [5] No Known Allergies

## 2025-06-12 NOTE — ASSESSMENT & PLAN NOTE
He states he ran out of statin about a month ago, knew he was coming in today  Refill sent in  - Patient educated and counseled to do walking and exercise regularly  - Low-fat low-cholesterol diet is advised  - Advised to reduce weight   - The goal is to keep the LDL less than 70, and HDL the more than 40

## 2025-06-12 NOTE — ASSESSMENT & PLAN NOTE
He states his left shoulder started hurting about two months ago, denies any falls or trauma  States pain is improving  Declines oral steroids or PT referral

## 2025-06-12 NOTE — ASSESSMENT & PLAN NOTE
CT chest without contrast from 06/10/24 revealed: 1.  No acute process.  2. A few tiny pulmonary nodules which are stable since 2016 which are considered benign. No further follow-up scan is recommended

## 2025-06-14 LAB
25(OH)D3+25(OH)D2 SERPL-MCNC: 43 NG/ML (ref 30–100)
ALBUMIN SERPL-MCNC: 4.3 G/DL (ref 3.6–5.1)
ALP SERPL-CCNC: 66 U/L (ref 35–144)
ALT SERPL-CCNC: 25 U/L (ref 9–46)
ANION GAP SERPL CALCULATED.4IONS-SCNC: 10 MMOL/L (CALC) (ref 7–17)
AST SERPL-CCNC: 25 U/L (ref 10–35)
BASOPHILS # BLD AUTO: 40 CELLS/UL (ref 0–200)
BASOPHILS NFR BLD AUTO: 1 %
BILIRUB SERPL-MCNC: 0.8 MG/DL (ref 0.2–1.2)
BUN SERPL-MCNC: 17 MG/DL (ref 7–25)
CALCIUM SERPL-MCNC: 9.3 MG/DL (ref 8.6–10.3)
CHLORIDE SERPL-SCNC: 102 MMOL/L (ref 98–110)
CHOLEST SERPL-MCNC: 188 MG/DL
CHOLEST/HDLC SERPL: 3.8 (CALC)
CO2 SERPL-SCNC: 27 MMOL/L (ref 20–32)
CREAT SERPL-MCNC: 0.89 MG/DL (ref 0.7–1.3)
EGFRCR SERPLBLD CKD-EPI 2021: 99 ML/MIN/1.73M2
EOSINOPHIL # BLD AUTO: 120 CELLS/UL (ref 15–500)
EOSINOPHIL NFR BLD AUTO: 3 %
ERYTHROCYTE [DISTWIDTH] IN BLOOD BY AUTOMATED COUNT: 13.1 % (ref 11–15)
GLUCOSE SERPL-MCNC: 99 MG/DL (ref 65–99)
HCT VFR BLD AUTO: 46.7 % (ref 38.5–50)
HDLC SERPL-MCNC: 49 MG/DL
HGB BLD-MCNC: 15.8 G/DL (ref 13.2–17.1)
LDLC SERPL CALC-MCNC: 121 MG/DL (CALC)
LYMPHOCYTES # BLD AUTO: 1236 CELLS/UL (ref 850–3900)
LYMPHOCYTES NFR BLD AUTO: 30.9 %
MCH RBC QN AUTO: 30.9 PG (ref 27–33)
MCHC RBC AUTO-ENTMCNC: 33.8 G/DL (ref 32–36)
MCV RBC AUTO: 91.2 FL (ref 80–100)
MONOCYTES # BLD AUTO: 528 CELLS/UL (ref 200–950)
MONOCYTES NFR BLD AUTO: 13.2 %
NEUTROPHILS # BLD AUTO: 2076 CELLS/UL (ref 1500–7800)
NEUTROPHILS NFR BLD AUTO: 51.9 %
NONHDLC SERPL-MCNC: 139 MG/DL (CALC)
PLATELET # BLD AUTO: 282 THOUSAND/UL (ref 140–400)
PMV BLD REES-ECKER: 9.6 FL (ref 7.5–12.5)
POTASSIUM SERPL-SCNC: 3.9 MMOL/L (ref 3.5–5.3)
PROT SERPL-MCNC: 6.8 G/DL (ref 6.1–8.1)
PSA SERPL-MCNC: 1.64 NG/ML
RBC # BLD AUTO: 5.12 MILLION/UL (ref 4.2–5.8)
SODIUM SERPL-SCNC: 139 MMOL/L (ref 135–146)
TRIGL SERPL-MCNC: 83 MG/DL
TSH SERPL-ACNC: 0.97 MIU/L (ref 0.4–4.5)
VIT B12 SERPL-MCNC: 639 PG/ML (ref 200–1100)
WBC # BLD AUTO: 4 THOUSAND/UL (ref 3.8–10.8)

## 2025-12-16 ENCOUNTER — APPOINTMENT (OUTPATIENT)
Dept: PRIMARY CARE | Facility: CLINIC | Age: 60
End: 2025-12-16
Payer: COMMERCIAL